# Patient Record
Sex: FEMALE | Race: WHITE | Employment: UNEMPLOYED | URBAN - METROPOLITAN AREA
[De-identification: names, ages, dates, MRNs, and addresses within clinical notes are randomized per-mention and may not be internally consistent; named-entity substitution may affect disease eponyms.]

---

## 2017-11-21 ENCOUNTER — TRANSFERRED RECORDS (OUTPATIENT)
Dept: HEALTH INFORMATION MANAGEMENT | Facility: CLINIC | Age: 42
End: 2017-11-21

## 2018-06-11 ENCOUNTER — TRANSFERRED RECORDS (OUTPATIENT)
Dept: HEALTH INFORMATION MANAGEMENT | Facility: CLINIC | Age: 43
End: 2018-06-11

## 2018-06-14 ENCOUNTER — TRANSFERRED RECORDS (OUTPATIENT)
Dept: HEALTH INFORMATION MANAGEMENT | Facility: CLINIC | Age: 43
End: 2018-06-14

## 2018-06-21 ENCOUNTER — MEDICAL CORRESPONDENCE (OUTPATIENT)
Dept: HEALTH INFORMATION MANAGEMENT | Facility: CLINIC | Age: 43
End: 2018-06-21

## 2018-06-26 DIAGNOSIS — R79.89 ABNORMAL LFTS (LIVER FUNCTION TESTS): Primary | ICD-10-CM

## 2018-07-09 ENCOUNTER — TRANSFERRED RECORDS (OUTPATIENT)
Dept: HEALTH INFORMATION MANAGEMENT | Facility: CLINIC | Age: 43
End: 2018-07-09

## 2018-07-10 ENCOUNTER — TRANSFERRED RECORDS (OUTPATIENT)
Dept: HEALTH INFORMATION MANAGEMENT | Facility: CLINIC | Age: 43
End: 2018-07-10

## 2018-07-18 ENCOUNTER — OFFICE VISIT (OUTPATIENT)
Dept: GASTROENTEROLOGY | Facility: CLINIC | Age: 43
End: 2018-07-18
Attending: PHYSICIAN ASSISTANT
Payer: COMMERCIAL

## 2018-07-18 ENCOUNTER — HEALTH MAINTENANCE LETTER (OUTPATIENT)
Age: 43
End: 2018-07-18

## 2018-07-18 VITALS
SYSTOLIC BLOOD PRESSURE: 137 MMHG | HEIGHT: 70 IN | OXYGEN SATURATION: 97 % | HEART RATE: 63 BPM | TEMPERATURE: 97.6 F | DIASTOLIC BLOOD PRESSURE: 89 MMHG | WEIGHT: 293 LBS | BODY MASS INDEX: 41.95 KG/M2

## 2018-07-18 DIAGNOSIS — R79.89 ABNORMAL LFTS (LIVER FUNCTION TESTS): ICD-10-CM

## 2018-07-18 DIAGNOSIS — R79.89 ELEVATED LFTS: ICD-10-CM

## 2018-07-18 DIAGNOSIS — R79.89 ELEVATED LFTS: Primary | ICD-10-CM

## 2018-07-18 DIAGNOSIS — Z86.19 HISTORY OF CMV: ICD-10-CM

## 2018-07-18 LAB
ALBUMIN SERPL-MCNC: 3.8 G/DL (ref 3.4–5)
ALP SERPL-CCNC: 88 U/L (ref 40–150)
ALT SERPL W P-5'-P-CCNC: 138 U/L (ref 0–50)
ANION GAP SERPL CALCULATED.3IONS-SCNC: 8 MMOL/L (ref 3–14)
AST SERPL W P-5'-P-CCNC: 86 U/L (ref 0–45)
BILIRUB DIRECT SERPL-MCNC: 0.1 MG/DL (ref 0–0.2)
BILIRUB SERPL-MCNC: 0.4 MG/DL (ref 0.2–1.3)
BUN SERPL-MCNC: 20 MG/DL (ref 7–30)
CALCIUM SERPL-MCNC: 8.5 MG/DL (ref 8.5–10.1)
CHLORIDE SERPL-SCNC: 112 MMOL/L (ref 94–109)
CO2 SERPL-SCNC: 22 MMOL/L (ref 20–32)
CREAT SERPL-MCNC: 0.8 MG/DL (ref 0.52–1.04)
ERYTHROCYTE [DISTWIDTH] IN BLOOD BY AUTOMATED COUNT: 14.6 % (ref 10–15)
FERRITIN SERPL-MCNC: 325 NG/ML (ref 12–150)
GFR SERPL CREATININE-BSD FRML MDRD: 78 ML/MIN/1.7M2
GLUCOSE SERPL-MCNC: 125 MG/DL (ref 70–99)
HBA1C MFR BLD: 4.4 % (ref 0–5.6)
HCT VFR BLD AUTO: 39.3 % (ref 35–47)
HGB BLD-MCNC: 12.9 G/DL (ref 11.7–15.7)
INR PPP: 1.02 (ref 0.86–1.14)
IRON SATN MFR SERPL: 21 % (ref 15–46)
IRON SERPL-MCNC: 70 UG/DL (ref 35–180)
MCH RBC QN AUTO: 29.8 PG (ref 26.5–33)
MCHC RBC AUTO-ENTMCNC: 32.8 G/DL (ref 31.5–36.5)
MCV RBC AUTO: 91 FL (ref 78–100)
PLATELET # BLD AUTO: 217 10E9/L (ref 150–450)
POTASSIUM SERPL-SCNC: 4.4 MMOL/L (ref 3.4–5.3)
PROT SERPL-MCNC: 7.4 G/DL (ref 6.8–8.8)
RBC # BLD AUTO: 4.33 10E12/L (ref 3.8–5.2)
SODIUM SERPL-SCNC: 142 MMOL/L (ref 133–144)
TIBC SERPL-MCNC: 343 UG/DL (ref 240–430)
WBC # BLD AUTO: 6.4 10E9/L (ref 4–11)

## 2018-07-18 PROCEDURE — 83540 ASSAY OF IRON: CPT | Performed by: PHYSICIAN ASSISTANT

## 2018-07-18 PROCEDURE — 86039 ANTINUCLEAR ANTIBODIES (ANA): CPT | Performed by: PHYSICIAN ASSISTANT

## 2018-07-18 PROCEDURE — 80076 HEPATIC FUNCTION PANEL: CPT | Performed by: PHYSICIAN ASSISTANT

## 2018-07-18 PROCEDURE — 83550 IRON BINDING TEST: CPT | Performed by: PHYSICIAN ASSISTANT

## 2018-07-18 PROCEDURE — 83516 IMMUNOASSAY NONANTIBODY: CPT | Mod: 91 | Performed by: PHYSICIAN ASSISTANT

## 2018-07-18 PROCEDURE — 85610 PROTHROMBIN TIME: CPT | Performed by: PHYSICIAN ASSISTANT

## 2018-07-18 PROCEDURE — 83516 IMMUNOASSAY NONANTIBODY: CPT | Performed by: PHYSICIAN ASSISTANT

## 2018-07-18 PROCEDURE — 85027 COMPLETE CBC AUTOMATED: CPT | Performed by: PHYSICIAN ASSISTANT

## 2018-07-18 PROCEDURE — 83036 HEMOGLOBIN GLYCOSYLATED A1C: CPT | Performed by: PHYSICIAN ASSISTANT

## 2018-07-18 PROCEDURE — 82728 ASSAY OF FERRITIN: CPT | Performed by: PHYSICIAN ASSISTANT

## 2018-07-18 PROCEDURE — 36415 COLL VENOUS BLD VENIPUNCTURE: CPT | Performed by: PHYSICIAN ASSISTANT

## 2018-07-18 PROCEDURE — 80048 BASIC METABOLIC PNL TOTAL CA: CPT | Performed by: PHYSICIAN ASSISTANT

## 2018-07-18 PROCEDURE — 86038 ANTINUCLEAR ANTIBODIES: CPT | Performed by: PHYSICIAN ASSISTANT

## 2018-07-18 PROCEDURE — G0463 HOSPITAL OUTPT CLINIC VISIT: HCPCS | Mod: ZF

## 2018-07-18 RX ORDER — THYROID 60 MG/1
60 TABLET ORAL DAILY
COMMUNITY
Start: 2018-06-12

## 2018-07-18 ASSESSMENT — PAIN SCALES - GENERAL: PAINLEVEL: NO PAIN (0)

## 2018-07-18 NOTE — NURSING NOTE
Chief Complaint   Patient presents with     Consult     consult Elevated CMV      Mansoor Godoy MA

## 2018-07-18 NOTE — PROGRESS NOTES
Hepatology Clinic note  Briana Linda   Date of Birth 1975  Date of Service 7/18/2018     REASON FOR CONSULTATION: Elevated LFT's  REFERRING PROVIDER: Corey Linda MD         Assessment/plan:   Briana Linda is a 43 year old female with history of elevated liver function tests dating back to April 2015. Recent abdominal ultrasound showing steatohepatitis. Liver function is otherwise normal.   No current evidence of biochemical, radiological or physical markers of cirrhosis. Risk factors for fatty liver disease include morbid obesity, weight gain, history of high cholesterol and abnormal fasting glucose. Her Hep B and C serologies are negative. She has history of a positive STEPHANIA and history of hypothyroidism, so it will rule out autoimmune liver disease. We discussed the pathophysiology and natural history of nonalcoholic fatty liver disease. We discussed NAFLD which includes optimization of weight loss, as well as optimization of risk factors including cholesterol and blood glucose.     - Discussed importance of proper nutrition and exercise - emphasizing limit carbohydrates, especially simple carbohydrates. Mediterranean Diet   - We discussed how a weight loss of 3-5% can show improvement on steatosis and weight loss of 7-10% can show improvement on fibrosis on histology.  - Check STEPHANIA, f-actin, iron studies, hemoglobin A1c  - Will check CMV DNA   - Recommended following up with her PCP in regards to positive CMV IGG and IGM and symptoms of temperature dysregulation, fatigue, joint pains   - Follow up in  6 months     Tammy Sloan PA-C   [facility]     -----------------------------------------------------       HPI:   Briana Linda is a 43 year old female presenting for the evaluation of elevated LFT's.     Patient states that she got mononucleosis at the age of 18.  She states that she feels like the virus never cleared.  Throughout the past 20+ years she has about with an array of  problems including is getting sick very easily.  She recently had a lab workup with her primary care physician noted elevated CMV levels.  She also had elevated liver transaminases.  On chart review noted elevated transaminases back to April 2015.  She had an abdominal ultrasound that noted steatohepatitis in November 2017. Transaminases including ALT have ranged from 114 to 55 and AST to 68 to 38.     She was started on [place] in the middle of June for hypothyroidismism.  She has been on levothyroxine in the past and did not tolerate it.  There is number of years where she did not take any medication.  After labor day, she had problems with temperature regulation.  She would have extreme sweating and then shift to shivering really bad. Then in mid June she states that she had intermittent fevers for a number of days.  She also notes symptoms of shortness of breath, lightheadedness and foggy thinking and overall feeling lethargic.     At this time, she is still unable to tolerate any heat. Her hands get shaky. She states her taste buds have changed and she doesn't have much of an appetite. Weight loss has been difficult for her. Attempts of increasing physical activity have lead to weight gain in the past. She has gained nearly 20 lbs in the last year. She also notes a period of time where was hospitalized she had severe headaches. She states her symptoms improved with acyclovir.     Patient denies jaundice, lower extremity edema, abdominal distension or confusion.  Patient also denies melena, hematochezia or hematemesis. Patient denies weight loss, fevers, sweats or chills.    PMH: morbid obesity, hypothyroidism    SMH: hysterectomy, oophorectomy, cholecystectomy, uterine prolapse repair    Medications:  Massena thyroid, omeprazole PRN, acid reflux     No previous tobacco use. She drinks one to two times a month. No previous IV/IN drug use.    Currently does not work. Patient currently lives her  and  children.     No family history of liver disease or liver cancer.     CMV IgG 1.20 by  CMV IgM greater than 240 high  STEPHANIA positive, 1: 85  Have a IgM nonreactive  Hep B surface antigen nonreactive hep B core antibody nonreactive  Hep C antibody negative  Vitamin D - 11 - low     Medical hx Surgical hx   Past Medical History:   Diagnosis Date     Hypothyroidism     Past Surgical History:   Procedure Laterality Date     HYSTERECTOMY, VAGINAL  1/24/2009                 Medications:     Current Outpatient Prescriptions   Medication     thyroid (ARMOUR THYROID) 60 MG tablet     No current facility-administered medications for this visit.             Allergies:     Allergies   Allergen Reactions     Amoxicillin      Zithromax [Azithromycin Dihydrate]             Social History:     Social History     Social History     Marital status:      Spouse name: N/A     Number of children: N/A     Years of education: N/A     Occupational History     Not on file.     Social History Main Topics     Smoking status: Never Smoker     Smokeless tobacco: Never Used     Alcohol use Yes      Comment: rare     Drug use: No     Sexual activity: Not on file     Other Topics Concern     Not on file     Social History Narrative            Family History:     Family History   Problem Relation Age of Onset     Cancer Mother      ovarian     Cancer Maternal Grandmother      lung     Myocardial Infarction Maternal Grandfather      Myocardial Infarction Paternal Grandfather               Review of Systems:   Gen: See HPI     HEENT: No change in vision or hearing, mouth sores, dysphagia, lymph nodes  Resp: No shortness of breath, coughing, hx of asthma  CV: No chest pain, palpitations, syncope   GI: See HPI  : No dysuria, history of stones, urine color    Skin: No rash; no pruritus or psoriasis  MS: No arthralgias, myalgias, joint swelling  Neuro: No memory changes, confusion, numbness    Heme: No difficulty clotting, bruising,  "bleeding  Psych:  No anxiety, depression, agitation          Physical Exam:   VS:  /89  Pulse 63  Temp 97.6  F (36.4  C) (Oral)  Ht 1.784 m (5' 10.25\")  Wt 134.4 kg (296 lb 6.4 oz)  SpO2 97%  BMI 42.23 kg/m2      Gen: A&Ox3, NAD, morbidly obese  HEENT: non-icteric,  no cervical lymphadenopathy, no lesions or ulcers in oropharynx  CV: RRR, no overt murmurs  Lung: CTA Bilatererally, no wheezing or crackles.   Lym- no palpable lymphadenopathy  Abd: soft, NT, ND, unable to palpate organomegaly due to body habitus  Ext: no edema, intact pulses.   Skin: No rash, no palmar erythema, telangiectasias or jaundice  Neuro: grossly intact, no asterixis   Psych: appropriate mood and affects         Data:     Abdominal ultrasound limited 11/21/2017:  Abdominal ultrasound noting diffusely increased and coarsened hepatic parenchymal echo texture consistent with hepatic steatosis  "

## 2018-07-18 NOTE — LETTER
7/18/2018      RE: Briana Linda  [address] [facility] [place] [address]       Hepatology Clinic note  Briana Linda   Date of Birth 1975  Date of Service 7/18/2018     REASON FOR CONSULTATION: Elevated LFT's  REFERRING PROVIDER: Corey Linda MD         Assessment/plan:   Briana Linda is a 43 year old female with history of elevated liver function tests dating back to April 2015. Recent abdominal ultrasound showing steatohepatitis. Liver function is otherwise normal.   No current evidence of biochemical, radiological or physical markers of cirrhosis. Risk factors for fatty liver disease include morbid obesity, weight gain, history of high cholesterol and abnormal fasting glucose. Her Hep B and C serologies are negative. She has history of a positive STEPHANIA and history of hypothyroidism, so it will rule out autoimmune liver disease. We discussed the pathophysiology and natural history of nonalcoholic fatty liver disease. We discussed NAFLD which includes optimization of weight loss, as well as optimization of risk factors including cholesterol and blood glucose.     - Discussed importance of proper nutrition and exercise - emphasizing limit carbohydrates, especially simple carbohydrates. Mediterranean Diet   - We discussed how a weight loss of 3-5% can show improvement on steatosis and weight loss of 7-10% can show improvement on fibrosis on histology.  - Check STEPHANIA, f-actin, iron studies, hemoglobin A1c  - Will check CMV DNA   - Recommended following up with her PCP in regards to positive CMV IGG and IGM and symptoms of temperature dysregulation, fatigue, joint pains   - Follow up in  6 months     Tammy Sloan PA-C   [facility] Hepat[facility]     -----------------------------------------------------       HPI:   Briana Linda is a 43 year old female presenting for the evaluation of elevated LFT's.     Patient states that she got mononucleosis at the age of 18.  She states that she feels like the virus  never cleared.  Throughout the past 20+ years she has about with an array of problems including is getting sick very easily.  She recently had a lab workup with her primary care physician noted elevated CMV levels.  She also had elevated liver transaminases.  On chart review noted elevated transaminases back to April 2015.  She had an abdominal ultrasound that noted steatohepatitis in November 2017. Transaminases including ALT have ranged from 114 to 55 and AST to 68 to 38.     She was started on Gateway in the middle of June for hypothyroidismism.  She has been on levothyroxine in the past and did not tolerate it.  There is number of years where she did not take any medication.  After labor day, she had problems with temperature regulation.  She would have extreme sweating and then shift to shivering really bad. Then in mid June she states that she had intermittent fevers for a number of days.  She also notes symptoms of shortness of breath, lightheadedness and foggy thinking and overall feeling lethargic.     At this time, she is still unable to tolerate any heat. Her hands get shaky. She states her taste buds have changed and she doesn't have much of an appetite. Weight loss has been difficult for her. Attempts of increasing physical activity have lead to weight gain in the past. She has gained nearly 20 lbs in the last year. She also notes a period of time where was hospitalized she had severe headaches. She states her symptoms improved with acyclovir.     Patient denies jaundice, lower extremity edema, abdominal distension or confusion.  Patient also denies melena, hematochezia or hematemesis. Patient denies weight loss, fevers, sweats or chills.    PMH: morbid obesity, hypothyroidism    SMH: hysterectomy, oophorectomy, cholecystectomy, uterine prolapse repair    Medications:  Gateway thyroid, omeprazole PRN, acid reflux     No previous tobacco use. She drinks one to two times a month. No previous IV/IN drug use.     Currently does not work. Patient currently lives her  and children.     No family history of liver disease or liver cancer.     CMV IgG 1.20 by  CMV IgM greater than 240 high  STEPHANIA positive, 1: 85  Have a IgM nonreactive  Hep B surface antigen nonreactive hep B core antibody nonreactive  Hep C antibody negative  Vitamin D - 11 - low     Medical hx Surgical hx   Past Medical History:   Diagnosis Date     Hypothyroidism     Past Surgical History:   Procedure Laterality Date     HYSTERECTOMY, VAGINAL  1/24/2009                 Medications:     Current Outpatient Prescriptions   Medication     thyroid (ARMOUR THYROID) 60 MG tablet     No current facility-administered medications for this visit.             Allergies:     Allergies   Allergen Reactions     Amoxicillin      Zithromax [Azithromycin Dihydrate]             Social History:     Social History     Social History     Marital status:      Spouse name: N/A     Number of children: N/A     Years of education: N/A     Occupational History     Not on file.     Social History Main Topics     Smoking status: Never Smoker     Smokeless tobacco: Never Used     Alcohol use Yes      Comment: rare     Drug use: No     Sexual activity: Not on file     Other Topics Concern     Not on file     Social History Narrative            Family History:     Family History   Problem Relation Age of Onset     Cancer Mother      ovarian     Cancer Maternal Grandmother      lung     Myocardial Infarction Maternal Grandfather      Myocardial Infarction Paternal Grandfather               Review of Systems:   Gen: See HPI     HEENT: No change in vision or hearing, mouth sores, dysphagia, lymph nodes  Resp: No shortness of breath, coughing, hx of asthma  CV: No chest pain, palpitations, syncope   GI: See HPI  : No dysuria, history of stones, urine color    Skin: No rash; no pruritus or psoriasis  MS: No arthralgias, myalgias, joint swelling  Neuro: No memory changes,  "confusion, numbness    Heme: No difficulty clotting, bruising, bleeding  Psych:  No anxiety, depression, agitation          Physical Exam:   VS:  /89  Pulse 63  Temp 97.6  F (36.4  C) (Oral)  Ht 1.784 m (5' 10.25\")  Wt 134.4 kg (296 lb 6.4 oz)  SpO2 97%  BMI 42.23 kg/m2      Gen: A&Ox3, NAD, morbidly obese  HEENT: non-icteric,  no cervical lymphadenopathy, no lesions or ulcers in oropharynx  CV: RRR, no overt murmurs  Lung: CTA Bilatererally, no wheezing or crackles.   Lym- no palpable lymphadenopathy  Abd: soft, NT, ND, unable to palpate organomegaly due to body habitus  Ext: no edema, intact pulses.   Skin: No rash, no palmar erythema, telangiectasias or jaundice  Neuro: grossly intact, no asterixis   Psych: appropriate mood and affects         Data:     Abdominal ultrasound limited 11/21/2017:  Abdominal ultrasound noting diffusely increased and coarsened hepatic parenchymal echo texture consistent with hepatic steatosis    Tammy Sloan PA-C      "

## 2018-07-18 NOTE — MR AVS SNAPSHOT
After Visit Summary   7/18/2018    Briana Linda    MRN: [id number]           Patient Information     Date Of Birth          1975        Visit Information        Provider Department      7/18/2018 1:45 PM Tammy Sloan PA-C [facility] Hepat[facility]        Today's Diagnoses     Elevated LFTs    -  1       Follow-ups after your visit        Follow-up notes from your care team     Return in about 6 months (around 1/18/2019).      Your next 10 appointments already scheduled     Jul 18, 2018  2:45 PM CDT   Lab with  LAB   [facility] ([facility])    [address]  [address] [facility] [address] [phone]            Jan 14, 2019 11:00 AM CST   Lab with  LAB   [facility] ([facility])    [address] [address] [phone]            Jan 14, 2019 12:00 PM CST   (Arrive by 11:45 AM)   Return General Liver with Tammy Sloan PA-C   [facility] ([facility])    [address] [place] [address]  [address]  [facility] [address] [phone]              Future tests that were ordered for you today     Open Future Orders        Priority Expected Expires Ordered    Hemoglobin A1c Routine  8/17/2018 7/18/2018    Tissue transglutaminase kayla IgA and IgG Routine  8/17/2018 7/18/2018    IRON Routine  8/17/2018 7/18/2018    IRON AND IRON BINDING CAPACITY Routine  8/17/2018 7/18/2018    FERRITIN Routine  8/17/2018 7/18/2018            Who to contact     If you have questions or need follow up information about today's clinic visit or your schedule please contact [facility] HEPATOLOG[facility] directly at [phone].  Normal or non-critical lab and imaging results will be communicated to you by MyChart, letter or phone within 4 business days after the clinic has received the results. If you do not hear from us within 7 days, please contact the clinic through MyChart or phone.  "If you have a critical or abnormal lab result, we will notify you by phone as soon as possible.  Submit refill requests through [facility] or call your pharmacy and they will forward the refill request to us. Please allow 3 business days for your refill to be completed.          Additional Information About Your Visit        Context[employer]ker[employer] Information     [facility] gives you secure access to your electronic health record. If you see a primary care provider, you can also send messages to your care team and make appointments. If you have questions, please call your primary care clinic.  If you do not have a primary care provider, please call [phone] and they will assist you.        Care EveryWhere ID     This is your Care EveryWhere ID. This could be used by other organizations to access your [place] medical records  [id number]        Your Vitals Were     Pulse Temperature Height Pulse Oximetry BMI (Body Mass Index)       63 97.6  F (36.4  C) (Oral) 1.784 m (5' 10.25\") 97% 42.23 kg/m2        Blood Pressure from Last 3 Encounters:   07/18/18 137/89   04/11/13 132/84    Weight from Last 3 Encounters:   07/18/18 134.4 kg (296 lb 6.4 oz)   04/11/13 127 kg (280 lb)              We Performed the Following     Anti Nuclear Griselda IgG by IFA with Reflex        Primary Care Provider Office Phone # Fax #    Corey Linda [phone]       [place] [address]        Equal Access to Services     VENITA PALAFOX AH: Hadii mame ku hadasho Soomaali, waaxda luqadaha, qaybta kaalmada adeegyada, marina bae . So [facility] [phone].    ATENCIÓN: Si habla español, tiene a elizabeth disposición servicios gratuitos de asistencia lingüística. Aliya al [phone].    We comply with applicable federal civil rights laws and [place] laws. We do not discriminate on the basis of race, color, national origin, age, disability, sex, sexual orientation, or gender identity.       "      Thank you!     Thank you for choosing [facility]PAT[facility]  for your care. Our goal is always to provide you with excellent care. Hearing back from our patients is one way we can continue to improve our services. Please take a few minutes to complete the written survey that you may receive in the mail after your visit with us. Thank you!             Your Updated Medication List - Protect others around you: Learn how to safely use, store and throw away your medicines at www.disposemymeds.org.          This list is accurate as of 7/18/18  2:43 PM.  Always use your most recent med list.                   Brand Name Dispense Instructions for use Diagnosis    ARMOUR THYROID 60 MG tablet   Generic drug:  thyroid       Elevated LFTs

## 2018-07-19 LAB
ANA PAT SER IF-IMP: ABNORMAL
ANA SER QL IF: POSITIVE
ANA TITR SER IF: ABNORMAL {TITER}
CMV DNA SPEC NAA+PROBE-ACNC: NORMAL [IU]/ML
CMV DNA SPEC NAA+PROBE-LOG#: NORMAL {LOG_IU}/ML
SPECIMEN SOURCE: NORMAL
TTG IGA SER-ACNC: <1 U/ML
TTG IGG SER-ACNC: <1 U/ML

## 2018-07-20 ENCOUNTER — MEDICAL CORRESPONDENCE (OUTPATIENT)
Dept: HEALTH INFORMATION MANAGEMENT | Facility: CLINIC | Age: 43
End: 2018-07-20

## 2018-07-22 LAB — SMA IGG SER-ACNC: 8 UNITS (ref 0–19)

## 2018-07-26 ENCOUNTER — TELEPHONE (OUTPATIENT)
Dept: RHEUMATOLOGY | Facility: CLINIC | Age: 43
End: 2018-07-26

## 2018-07-26 NOTE — TELEPHONE ENCOUNTER
Health Call Center    Phone Message    May a detailed message be left on voicemail: Unknown     Reason for Call: Other: Dr. Corey Linda with [facility] is referring this patient to Rheumatology for cytomegalovirus infection, unspecified cytomegaloviral infection type. The records have been forwarded to the clinic for review.      Action Taken: Message routed to:  [facility] ([facility]  Rheumatology

## 2018-08-13 NOTE — TELEPHONE ENCOUNTER
Spoke with patient who stated that this appointment is no longer needed.  Becca Caldera CMA  8/13/2018 3:26 PM

## 2018-08-14 ENCOUNTER — TELEPHONE (OUTPATIENT)
Dept: INFECTIOUS DISEASES | Facility: CLINIC | Age: 43
End: 2018-08-14

## 2018-08-14 ENCOUNTER — TRANSFERRED RECORDS (OUTPATIENT)
Dept: HEALTH INFORMATION MANAGEMENT | Facility: CLINIC | Age: 43
End: 2018-08-14

## 2018-08-14 NOTE — TELEPHONE ENCOUNTER
Patient contacted and reminded of upcoming appointment.  Patient confirmed they will be attending.  Patient instructed to bring updated medications list to appointment.  Mansoor Godoy MA

## 2018-08-15 ENCOUNTER — OFFICE VISIT (OUTPATIENT)
Dept: INFECTIOUS DISEASES | Facility: CLINIC | Age: 43
End: 2018-08-15
Attending: INTERNAL MEDICINE
Payer: COMMERCIAL

## 2018-08-15 VITALS
TEMPERATURE: 97.8 F | DIASTOLIC BLOOD PRESSURE: 81 MMHG | BODY MASS INDEX: 41.95 KG/M2 | WEIGHT: 293 LBS | HEIGHT: 70 IN | SYSTOLIC BLOOD PRESSURE: 153 MMHG | RESPIRATION RATE: 18 BRPM | HEART RATE: 62 BPM | OXYGEN SATURATION: 98 %

## 2018-08-15 DIAGNOSIS — B25.9 CMV INFECTION, ACUTE (H): ICD-10-CM

## 2018-08-15 DIAGNOSIS — B27.10 CYTOMEGALOVIRAL MONONUCLEOSIS WITHOUT COMPLICATION: Primary | ICD-10-CM

## 2018-08-15 PROCEDURE — G0463 HOSPITAL OUTPT CLINIC VISIT: HCPCS | Mod: ZF

## 2018-08-15 ASSESSMENT — ENCOUNTER SYMPTOMS
NECK MASS: 0
POOR WOUND HEALING: 0
CHILLS: 0
FATIGUE: 1
DIARRHEA: 1
BLOOD IN STOOL: 0
NAUSEA: 1
PARALYSIS: 0
JOINT SWELLING: 0
BLOATING: 0
SHORTNESS OF BREATH: 1
BACK PAIN: 1
DYSPNEA ON EXERTION: 1
TREMORS: 0
NAIL CHANGES: 0
ALTERED TEMPERATURE REGULATION: 1
SPEECH CHANGE: 0
SMELL DISTURBANCE: 1
DECREASED APPETITE: 0
STIFFNESS: 1
DEPRESSION: 0
WEIGHT LOSS: 0
FEVER: 0
SPUTUM PRODUCTION: 0
DISTURBANCES IN COORDINATION: 0
SINUS PAIN: 0
TROUBLE SWALLOWING: 0
INSOMNIA: 0
VOMITING: 0
POSTURAL DYSPNEA: 0
POLYDIPSIA: 0
PANIC: 1
ARTHRALGIAS: 1
WHEEZING: 0
HEARTBURN: 1
MEMORY LOSS: 0
ABDOMINAL PAIN: 0
DECREASED CONCENTRATION: 0
MUSCLE CRAMPS: 0
MYALGIAS: 1
DIZZINESS: 1
SINUS CONGESTION: 0
SORE THROAT: 0
BOWEL INCONTINENCE: 0
COUGH: 0
HEADACHES: 1
POLYPHAGIA: 0
MUSCLE WEAKNESS: 0
NUMBNESS: 0
HEMOPTYSIS: 0
LOSS OF CONSCIOUSNESS: 0
RECTAL PAIN: 0
CONSTIPATION: 1
HALLUCINATIONS: 0
SEIZURES: 0
HOARSE VOICE: 0
WEAKNESS: 0
TINGLING: 0
NECK PAIN: 0
INCREASED ENERGY: 0
NIGHT SWEATS: 0
TASTE DISTURBANCE: 1
SKIN CHANGES: 0
SNORES LOUDLY: 0
NERVOUS/ANXIOUS: 0
COUGH DISTURBING SLEEP: 0
JAUNDICE: 0
WEIGHT GAIN: 0

## 2018-08-15 ASSESSMENT — PAIN SCALES - GENERAL: PAINLEVEL: NO PAIN (0)

## 2018-08-15 NOTE — PROGRESS NOTES
[facility]  Infectious Disease Clinic Note     Patient:  Briana Linda, Date of birth 1975, Medical record number [id number]  Date of Visit:  08/15/2018         Assessment and Plan:   Plan:  1) Continue to monitor clinical course.   2) No further workup needed at this time.  3) Follow up as needed.     Assessment:  Ms. Linda is a 42 y/o female with a history hypothyroidism who presents for evaluation regarding possible CMV infection. In late May - early June she started to experience fatigue, fevers, chills, rigors, night sweats, muscle and joint pain. On June 14, 2018 she had CMV serologies that were positive (IgG 1.20, IgM >240) and CT scan that demonstrated mild splenomegaly. She continued to have fevers till July at which time the fevers abated and her symptoms started to lessen. On 7/18/2018, a CMV PCR was not detected. She may have had an acute CMV mononucleosis with the protracted fevers and fatigue and without any tonsillar exudates and lymphadenopathy. Because primary CMV infection in immunocompetent persons is usually self-limiting and there is recovery within days to weeks, and she is currently improving, we would not recommend any intervention at this time.      Patient discussed with Dr. Marce Padilla, Infectious Diseases Fellow  [phone]        History of the Infectious Disease Illness:     Ms. Linda is a 42 y/o female with a history of hypothyroidism who presents for evaluation regarding CMV infection. She has had about a 2 month history of illness that is now just improving.     The time course is as follows:   May 28th - felt sick, fatigued, cold, chills  June 6th - Muscle pain, joint pain, sore throat   June 7th - Sinus pain, headache, ear fullness - at this time she received antibiotics   June 7th - She was noted to have a TSH 16.3, T4 0.8  June 11 -  She was started on Piseco 60 mg for thyroid hormone replacement   Su 10 - June 17 - She was  experiencing fevers , chills and rigors. She also had drenching night sweats   Her Fevers stopped on the June 17, but her muscle joint pain continued   The night sweats continued into July   Mid July - She starting feeling better - slowly regaining her energy and appetite     Today, she states that she continues to slowly improve. She is no longer experiencing fevers, chills, rigors, night sweats. She denies any chest pain, shortness of breath, cough. She denies any abdominal pain, nausea, vomiting, diarrhea, or constipation.     Exposure History:   [place], born and raised   She has visited: [place], [place], [place], [place], [place], [place], [place], [place], [place], [place], and [place]       No international travel   No hiking   Does garden actively   No fresh water exposure   Dog - house bound   Bird - Green cheek conure (5-6 years)   Fish   No wild animal exposure     Past Medical History:   Diagnosis Date     GERD (gastroesophageal reflux disease)      Hypothyroidism      Morbid obesity (H)      Past Surgical History:   Procedure Laterality Date     HYSTERECTOMY, VAGINAL  1/24/2009     Family History   Problem Relation Age of Onset     Cancer Mother      ovarian     Cancer Maternal Grandmother      lung     Myocardial Infarction Maternal Grandfather      Myocardial Infarction Paternal Grandfather      Social History     Social History     Marital status:      Spouse name: N/A     Number of children: N/A     Years of education: N/A     Occupational History     Not on file.     Social History Main Topics     Smoking status: Never Smoker     Smokeless tobacco: Never Used     Alcohol use Yes      Comment: rare     Drug use: No     Sexual activity: Not on file     Other Topics Concern     Not on file     Social History Narrative       There is no immunization history on file for this patient.  Patient Active Problem List   Diagnosis     CARDIOVASCULAR SCREENING; LDL GOAL LESS THAN  "160          Review of Systems:   CONSTITUTIONAL:  No fevers or chills, + loss of energy, easy fatigue   EYES: negative for icterus  ENT:  negative for hearing loss, tinnitus and sore throat  RESPIRATORY:  negative for cough with sputum and dyspnea  CARDIOVASCULAR:  negative for chest pain, dyspnea  GASTROINTESTINAL:  negative for nausea, vomiting, diarrhea and constipation  GENITOURINARY:  negative for dysuria  HEME:  No easy bruising  INTEGUMENT:  negative for rash and pruritus  NEURO:  Negative for headache         Current Medications (antimicrobials listed in bold):     Current Outpatient Prescriptions   Medication Sig Dispense Refill     thyroid (ARMOUR THYROID) 60 MG tablet Take 60 mg by mouth daily             Allergies:     Allergies   Allergen Reactions     Amoxicillin      Zithromax [Azithromycin Dihydrate]           Physical Exam:   Vitals were reviewed.   /81 (BP Location: Right arm, Patient Position: Chair, Cuff Size: Adult Regular)  Pulse 62  Temp 97.8  F (36.6  C) (Oral)  Resp 18  Ht 1.784 m (5' 10.25\")  Wt 134.3 kg (296 lb)  SpO2 98%  BMI 42.17 kg/m2  Exam:  GENERAL:  Overweight, well-developed, well-nourished, alert, oriented, in no acute distress.  HEENT:  Head is normocephalic, atraumatic     EYES:  Eyes have anicteric sclerae.    ENT:  Oropharynx is moist without exudates or ulcers.  NECK:  Supple.  LUNGS:  Clear to auscultation.  CARDIOVASCULAR:  Regular rate and rhythm with no murmurs, gallops or rubs.  ABDOMEN:  Normal bowel sounds, soft, nontender.  SKIN:  No acute rashes.   NEUROLOGIC:  Grossly nonfocal.         Laboratory Data:     Metabolic Studies    Recent Labs   Lab Test  07/18/18   1519   NA  142   POTASSIUM  4.4   CHLORIDE  112*   CO2  22   ANIONGAP  8   BUN  20   CR  0.80   GFRESTIMATED  78   GLC  125*   CHON  8.5     Hepatic Studies    Recent Labs   Lab Test  07/18/18   1519   BILITOTAL  0.4   ALKPHOS  88   ALBUMIN  3.8   AST  86*   ALT  138*     Hematology Studies  "   Recent Labs   Lab Test  07/18/18   1519   WBC  6.4   HGB  12.9   HCT  39.3   MCV  91   PLT  217     Clotting Studies    Recent Labs   Lab Test  07/18/18   1519   INR  1.02     Imaging:    CT: 6/14/2018: Mild splenomegaly

## 2018-08-15 NOTE — MR AVS SNAPSHOT
After Visit Summary   8/15/2018    Briana Linda    MRN: [id number]           Patient Information     Date Of Birth          1975        Visit Information        Provider Department      8/15/2018 2:30 PM Nasrin Padilla DO [facility] and Infectious Diseases        Today's Diagnoses     Cytomegaloviral mononucleosis without complication    -  1    CMV infection, acute (H)           Follow-ups after your visit        Your next 10 appointments already scheduled     Jan 14, 2019 11:00 AM CST   Lab with  LAB   [facility] ([facility])    [address]  1st Floor  [place] [address] [phone]            Jan 14, 2019 12:00 PM CST   (Arrive by 11:45 AM)   Return General Liver with Tammy Sloan PA-C   [facility] Hepatology ([facility])    [address]  [address]  [place] [address] [phone]              Who to contact     If you have questions or need follow up information about today's clinic visit or your schedule please contact [facility] AND INFECTIOUS DISEASES directly at [phone].  Normal or non-critical lab and imaging results will be communicated to you by Flexianthart, letter or phone within 4 business days after the clinic has received the results. If you do not hear from us within 7 days, please contact the clinic through Flexianthart or phone. If you have a critical or abnormal lab result, we will notify you by phone as soon as possible.  Submit refill requests through Pipette or call your pharmacy and they will forward the refill request to us. Please allow 3 business days for your refill to be completed.          Additional Information About Your Visit        MyChart Information     Pipette gives you secure access to your electronic health record. If you see a primary care provider, you can also send messages to your care team and make appointments. If you have  "questions, please call your primary care clinic.  If you do not have a primary care provider, please call [phone] and they will assist you.        Care EveryWhere ID     This is your Care EveryWhere ID. This could be used by other organizations to access your [facility] medical records  [id number]        Your Vitals Were     Pulse Temperature Respirations Height Pulse Oximetry BMI (Body Mass Index)    62 97.8  F (36.6  C) (Oral) 18 1.784 m (5' 10.25\") 98% 42.17 kg/m2       Blood Pressure from Last 3 Encounters:   08/15/18 153/81   07/18/18 137/89   04/11/13 132/84    Weight from Last 3 Encounters:   08/15/18 134.3 kg (296 lb)   07/18/18 134.4 kg (296 lb 6.4 oz)   04/11/13 127 kg (280 lb)              Today, you had the following     No orders found for display       Primary Care Provider Office Phone # Fax #    Corey Linda [phone]       [address] [address]        Equal Access to Services     [facility] : Hadii mame ku hadasho Soomaali, waaxda luqadaha, qaybta kaalmada adelaura, marina bae . So [facility] [phone].    ATENCIÓN: Si habla español, tiene a elizabteh disposición servicios gratuitos de asistencia lingüística. [facility] [phone].    We comply with applicable federal civil rights laws and [place] laws. We do not discriminate on the basis of race, color, national origin, age, disability, sex, sexual orientation, or gender identity.            Thank you!     Thank you for choosing [facility] AND INFECTIOUS DISEASES  for your care. Our goal is always to provide you with excellent care. Hearing back from our patients is one way we can continue to improve our services. Please take a few minutes to complete the written survey that you may receive in the mail after your visit with us. Thank you!             Your Updated Medication List - Protect others around you: Learn how to safely use, store and " throw away your medicines at www.disposemymeds.org.          This list is accurate as of 8/15/18 11:59 PM.  Always use your most recent med list.                   Brand Name Dispense Instructions for use Diagnosis    ARMOUR THYROID 60 MG tablet   Generic drug:  thyroid      Take 60 mg by mouth daily    Elevated LFTs

## 2018-09-13 NOTE — PROGRESS NOTES
Infectious Disease Clinic Staff Note: Ms. Linda was seen, examined, and the case was discussed with Dr. Padilla, ID Fellow -- I agree with her consultative history and examination, assessment and plan in this outpatient ID Consult note. This note reflects my observations and opinions, and the plan outlined fully reflects my approach. I have reviewed the available history, radiology, laboratory results, and reports with the Fellow.

## 2018-12-18 DIAGNOSIS — Z86.19 HISTORY OF CMV: ICD-10-CM

## 2018-12-18 DIAGNOSIS — R79.89 ABNORMAL LFTS (LIVER FUNCTION TESTS): Primary | ICD-10-CM

## 2019-01-18 ENCOUNTER — OFFICE VISIT (OUTPATIENT)
Dept: GASTROENTEROLOGY | Facility: CLINIC | Age: 44
End: 2019-01-18
Attending: PHYSICIAN ASSISTANT
Payer: COMMERCIAL

## 2019-01-18 VITALS
SYSTOLIC BLOOD PRESSURE: 143 MMHG | HEART RATE: 71 BPM | HEIGHT: 70 IN | TEMPERATURE: 98.4 F | BODY MASS INDEX: 41.95 KG/M2 | WEIGHT: 293 LBS | DIASTOLIC BLOOD PRESSURE: 88 MMHG

## 2019-01-18 DIAGNOSIS — Z86.19 HISTORY OF CMV: ICD-10-CM

## 2019-01-18 DIAGNOSIS — R79.89 ABNORMAL LFTS (LIVER FUNCTION TESTS): ICD-10-CM

## 2019-01-18 DIAGNOSIS — K76.0 FATTY LIVER DISEASE, NONALCOHOLIC: Primary | ICD-10-CM

## 2019-01-18 LAB
ALBUMIN SERPL-MCNC: 3.6 G/DL (ref 3.4–5)
ALP SERPL-CCNC: 76 U/L (ref 40–150)
ALT SERPL W P-5'-P-CCNC: 79 U/L (ref 0–50)
ANION GAP SERPL CALCULATED.3IONS-SCNC: 4 MMOL/L (ref 3–14)
AST SERPL W P-5'-P-CCNC: 43 U/L (ref 0–45)
BILIRUB DIRECT SERPL-MCNC: 0.1 MG/DL (ref 0–0.2)
BILIRUB SERPL-MCNC: 0.4 MG/DL (ref 0.2–1.3)
BUN SERPL-MCNC: 13 MG/DL (ref 7–30)
CALCIUM SERPL-MCNC: 8.6 MG/DL (ref 8.5–10.1)
CHLORIDE SERPL-SCNC: 106 MMOL/L (ref 94–109)
CO2 SERPL-SCNC: 27 MMOL/L (ref 20–32)
CREAT SERPL-MCNC: 0.76 MG/DL (ref 0.52–1.04)
ERYTHROCYTE [DISTWIDTH] IN BLOOD BY AUTOMATED COUNT: 13.3 % (ref 10–15)
GFR SERPL CREATININE-BSD FRML MDRD: >90 ML/MIN/{1.73_M2}
GLUCOSE SERPL-MCNC: 95 MG/DL (ref 70–99)
HCT VFR BLD AUTO: 42 % (ref 35–47)
HGB BLD-MCNC: 13.1 G/DL (ref 11.7–15.7)
INR PPP: 1.02 (ref 0.86–1.14)
MCH RBC QN AUTO: 28.3 PG (ref 26.5–33)
MCHC RBC AUTO-ENTMCNC: 31.2 G/DL (ref 31.5–36.5)
MCV RBC AUTO: 91 FL (ref 78–100)
PLATELET # BLD AUTO: 216 10E9/L (ref 150–450)
POTASSIUM SERPL-SCNC: 4.1 MMOL/L (ref 3.4–5.3)
PROT SERPL-MCNC: 7.5 G/DL (ref 6.8–8.8)
RBC # BLD AUTO: 4.63 10E12/L (ref 3.8–5.2)
SODIUM SERPL-SCNC: 137 MMOL/L (ref 133–144)
WBC # BLD AUTO: 5.6 10E9/L (ref 4–11)

## 2019-01-18 PROCEDURE — 80048 BASIC METABOLIC PNL TOTAL CA: CPT | Performed by: PHYSICIAN ASSISTANT

## 2019-01-18 PROCEDURE — 85027 COMPLETE CBC AUTOMATED: CPT | Performed by: PHYSICIAN ASSISTANT

## 2019-01-18 PROCEDURE — 80076 HEPATIC FUNCTION PANEL: CPT | Performed by: PHYSICIAN ASSISTANT

## 2019-01-18 PROCEDURE — 36415 COLL VENOUS BLD VENIPUNCTURE: CPT | Performed by: PHYSICIAN ASSISTANT

## 2019-01-18 PROCEDURE — G0463 HOSPITAL OUTPT CLINIC VISIT: HCPCS | Mod: ZF

## 2019-01-18 PROCEDURE — 85610 PROTHROMBIN TIME: CPT | Performed by: PHYSICIAN ASSISTANT

## 2019-01-18 ASSESSMENT — PAIN SCALES - GENERAL: PAINLEVEL: NO PAIN (0)

## 2019-01-18 ASSESSMENT — MIFFLIN-ST. JEOR: SCORE: 2142.32

## 2019-01-18 NOTE — LETTER
1/18/2019      RE: Briana Linda  [address] [address]       Hepatology Follow-up Clinic note  Briana Linda   Date of Birth 1975  Date of Service 1/18/2019    Reason for follow-up: Elevated LFT's          Assessment/plan:   Briana Linda is a 43 year old female with likely fatty liver disease. She did have a positive STEPHANIA, but negative f-actin. Transaminases today have reduced in half, but are still mildly elevated. She states her diet has improved. She has not started any regular exercise routine at this time. Her weight has been relatively stable. We discussed a lifestyle management program for the weight loss but she was not interested at this time. Her liver function is otherwise normal and she has no stigmata of chronic liver disease at this time.     - Continue slow gradual weight loss  - Optimization of co-morbidities   - Start regular exercise regimen   - Follow-up in clinic in 6 months or sooner as needed    Tammy Sloan PA-C   [facility] Hepatology clinic    Total time involved with patient was 25 minutes with >50% of time involved with counseling about nutrition and exercise as noted above.     -----------------------------------------------------       HPI:   Briana Linda is a 43 year old female  presenting for follow-up.     Patient was last seen by me on 7/18/2018. She denies any recent hospitalizations or ER visits. She was diagnosed with Hashimoto's thyroiditis. She was started on levothyroxine again and she states she had reactions to medications so she switched back to [place]. She states she lost some weight and has made changes to her diet including eating healthier snacks. She has not started any regular exercise. She is looking at joining a gym this coming week. Per chart review, her weight went from 296 to 309 lbs. She has gained about 30 lbs in the last 5 years.     Patient denies jaundice, lower extremity edema, abdominal distension or confusion.  Patient  "also denies melena, hematochezia or hematemesis. Patient denies weight loss, fevers, sweats or chills. No significant alcohol use. She lives with her  and 17 and 19 year old children.     Previous work-up:   CMV IgG 1.20 by  CMV IgM greater than 240 high  CMA DNA - not detected  STEPHANIA positive, 1:160  Hepatitis A IgM nonreactive  Hep B surface antigen nonreactive   hep B core antibody nonreactive  Hep C antibody negative  Vitamin D - 11 - low   f-actin- 8   Hemoglobin A1c: 4.4  TTg - less than 1 ({normal)        Medical hx Surgical hx   Past Medical History:   Diagnosis Date     GERD (gastroesophageal reflux disease)      Hypothyroidism      Morbid obesity (H)     Past Surgical History:   Procedure Laterality Date     HYSTERECTOMY, VAGINAL  1/24/2009                 Medications:     Current Outpatient Medications   Medication     thyroid (ARMOUR THYROID) 60 MG tablet     No current facility-administered medications for this visit.             Allergies:     Allergies   Allergen Reactions     Amoxicillin      Zithromax [Azithromycin Dihydrate]             Review of Systems:   10 points ROS was obtained and highlighted in the HPI, otherwise negative.          Physical Exam:   VS:  /88   Pulse 71   Temp 98.4  F (36.9  C) (Oral)   Ht 1.778 m (5' 10\")   Wt 140.2 kg (309 lb 1.6 oz)   BMI 44.35 kg/m         Gen- well, NAD, A+Ox3, normal color  Lym- no palpable LAD  CVS- RRR  RS- CTA  Abd- central adiposity, soft, nontender. No palpable organomegaly.   Extr- hands normal, no MARIELLA  Skin- no rash or jaundice  Neuro- no asterixis  Psych- normal mood, flat affect           Data:   Reviewed in person and significant for:    Lab Results   Component Value Date     07/18/2018      Lab Results   Component Value Date    POTASSIUM 4.4 07/18/2018     Lab Results   Component Value Date    CHLORIDE 112 07/18/2018     Lab Results   Component Value Date    CO2 22 07/18/2018     Lab Results   Component Value Date    BUN " 20 07/18/2018     Lab Results   Component Value Date    CR 0.80 07/18/2018       Lab Results   Component Value Date    WBC 6.4 07/18/2018     Lab Results   Component Value Date    HGB 12.9 07/18/2018     Lab Results   Component Value Date    HCT 39.3 07/18/2018     Lab Results   Component Value Date    MCV 91 07/18/2018     Lab Results   Component Value Date     07/18/2018       Lab Results   Component Value Date    AST 86 07/18/2018     Lab Results   Component Value Date     07/18/2018     No results found for: BILICONJ   Lab Results   Component Value Date    BILITOTAL 0.4 07/18/2018       Lab Results   Component Value Date    ALBUMIN 3.8 07/18/2018     Lab Results   Component Value Date    PROTTOTAL 7.4 07/18/2018      Lab Results   Component Value Date    ALKPHOS 88 07/18/2018       Lab Results   Component Value Date    INR 1.02 07/18/2018       Tammy Sloan PA-C

## 2019-01-18 NOTE — NURSING NOTE
"/88   Pulse 71   Temp 98.4  F (36.9  C) (Oral)   Ht 1.778 m (5' 10\")   Wt 140.2 kg (309 lb 1.6 oz)   BMI 44.35 kg/m    Chief Complaint   Patient presents with     RECHECK     follow up with elevated lab results, tzimmer cma       "

## 2019-06-13 DIAGNOSIS — K76.0 FATTY LIVER DISEASE, NONALCOHOLIC: Primary | ICD-10-CM

## 2019-07-18 ENCOUNTER — PRE VISIT (OUTPATIENT)
Dept: GASTROENTEROLOGY | Facility: CLINIC | Age: 44
End: 2019-07-18

## 2019-07-18 NOTE — PROGRESS NOTES
Hepatology Follow-up Clinic note  Briana Linda   Date of Birth 1975  Date of Service 7/18/2019    Reason for follow-up: Elevated LFT's         Assessment/plan:   Briana Linda is a 44 year old female with history of elevated LFT's, likely due to fatty liver disease. She has history of a positive STEPHANIA and normal F-actin. She does have hypothyroidism. Weight loss continues to be challenging for her. She has food intolerances with vegetables that causes significant abdominal and diarrhea. It is difficult to assess if she overexerts herself with physical activity. She would like benefit from a structured and guided physical activity program to ensure gradual increase of her physical activity. Discussed referral to weight management program, but she declines due to the distance as she lives in [place]. We discussed obtaining a liver biopsy to determine etiology of liver disease and baseline scarring, she will think about it. Transaminases are trending down, still mildly elevated. Her liver function is otherwise normal. No stigmata of chronic liver disease.     - Start regular physical activity / recommend structured program   - Recommend slow gradual weight   - Follow-up in clinic in 6 months     Tammy Sloan PA-C   [facility] Hepatology clinic    -----------------------------------------------------       HPI:   Briana Linda is a 44 year old female presenting for follow-up.     Dx: Elevated LFT's  Etiology:   Bx: declined    Patient was last seen by me on 1/18/2019.  She denies any new medications, ER visits or hospitalizations.    Her weight has remained stable.  She has tried multiple different diets including intermittent fasting and keto/low-carb diet. She states she gains weight when she starts eating a lower carb diet.  She states she will do it for a few days and her body does not tolerate it. She states she experiences abdominal pain and diarrhea after consuming cruciferous foods like  cauliflower.     For physical activity, she does try going for walks and bike rides.  She states that after her to physical activity, she develops cold-like symptoms.  She states she is not able to push herself due to this recurring cycle.      Patient denies jaundice, lower extremity edema, abdominal distension or confusion.  Patient also denies melena, hematochezia or hematemesis. Patient denies weight loss, fevers, sweats or chills.    She does not drink alcohol.     Previous work-up:   CMV IgG 1.20 by  CMV IgM greater than 240 high  CMA DNA - not detected  STEPHANIA positive, 1:160  Hepatitis A IgM nonreactive  Hep B surface antigen nonreactive   hep B core antibody nonreactive  Hep C antibody negative  Vitamin D - 11 - low   f-actin- 8   Hemoglobin A1c: 4.4  TTg - less than 1 ({normal)        Medical hx Surgical hx   Past Medical History:   Diagnosis Date     GERD (gastroesophageal reflux disease)      Hypothyroidism      Morbid obesity (H)     Past Surgical History:   Procedure Laterality Date     HYSTERECTOMY, VAGINAL  1/24/2009                 Medications:     Current Outpatient Medications   Medication     thyroid (ARMANI THYROID) 60 MG tablet     No current facility-administered medications for this visit.             Allergies:     Allergies   Allergen Reactions     Amoxicillin      Zithromax [Azithromycin Dihydrate]             Review of Systems:   10 points ROS was obtained and highlighted in the HPI, otherwise negative.          Physical Exam:   VS:  /82 (BP Location: Right arm, Patient Position: Sitting, Cuff Size: Adult Large)   Pulse 54   Temp 98  F (36.7  C)   Wt 138.1 kg (304 lb 6.4 oz)   BMI 43.68 kg/m        Gen- well, NAD, A+Ox3, normal color  Lym- no palpable LAD  CVS- RRR  RS- CTA  Abd-   Extr- hands normal, no MARIELLA  Skin- no rash or jaundice  Neuro- no asterixis  Psych- normal mood           Data:   Reviewed in person and significant for:    Lab Results   Component Value Date      01/18/2019      Lab Results   Component Value Date    POTASSIUM 4.1 01/18/2019     Lab Results   Component Value Date    CHLORIDE 106 01/18/2019     Lab Results   Component Value Date    CO2 27 01/18/2019     Lab Results   Component Value Date    BUN 13 01/18/2019     Lab Results   Component Value Date    CR 0.76 01/18/2019       Lab Results   Component Value Date    WBC 5.6 01/18/2019     Lab Results   Component Value Date    HGB 13.1 01/18/2019     Lab Results   Component Value Date    HCT 42.0 01/18/2019     Lab Results   Component Value Date    MCV 91 01/18/2019     Lab Results   Component Value Date     01/18/2019       Lab Results   Component Value Date    AST 43 01/18/2019     Lab Results   Component Value Date    ALT 79 01/18/2019     No results found for: BILICONJ   Lab Results   Component Value Date    BILITOTAL 0.4 01/18/2019       Lab Results   Component Value Date    ALBUMIN 3.6 01/18/2019     Lab Results   Component Value Date    PROTTOTAL 7.5 01/18/2019      Lab Results   Component Value Date    ALKPHOS 76 01/18/2019       Lab Results   Component Value Date    INR 1.02 01/18/2019

## 2019-07-18 NOTE — PROGRESS NOTES
Was the patient contacted by phone and reminded of the upcoming visit? Yes    Was the patient instructed to bring a current list of all medications to the appointment or instructed to bring in all medication bottles? Yes, patient verbalized understanding    Was the patient instructed to arrive prior to the appointment time to have ordered labs drawn? Yes, patient verbalized understanding    Were the needed lab orders placed? Yes    Was lab appointment made 1 hour or more prior to visit? yes    Patient instructed to arrive early for check-in    Jazmin Ferraro CMA  7/18/2019 6:05 PM

## 2019-07-19 ENCOUNTER — OFFICE VISIT (OUTPATIENT)
Dept: GASTROENTEROLOGY | Facility: CLINIC | Age: 44
End: 2019-07-19
Attending: PHYSICIAN ASSISTANT
Payer: COMMERCIAL

## 2019-07-19 VITALS
TEMPERATURE: 98 F | BODY MASS INDEX: 43.68 KG/M2 | SYSTOLIC BLOOD PRESSURE: 118 MMHG | DIASTOLIC BLOOD PRESSURE: 82 MMHG | WEIGHT: 293 LBS | HEART RATE: 54 BPM

## 2019-07-19 DIAGNOSIS — K76.0 FATTY LIVER DISEASE, NONALCOHOLIC: ICD-10-CM

## 2019-07-19 DIAGNOSIS — K76.0 FATTY LIVER DISEASE, NONALCOHOLIC: Primary | ICD-10-CM

## 2019-07-19 DIAGNOSIS — R79.89 ABNORMAL LFTS (LIVER FUNCTION TESTS): ICD-10-CM

## 2019-07-19 LAB
ALBUMIN SERPL-MCNC: 3.8 G/DL (ref 3.4–5)
ALP SERPL-CCNC: 72 U/L (ref 40–150)
ALT SERPL W P-5'-P-CCNC: 65 U/L (ref 0–50)
ANION GAP SERPL CALCULATED.3IONS-SCNC: 6 MMOL/L (ref 3–14)
AST SERPL W P-5'-P-CCNC: 34 U/L (ref 0–45)
BILIRUB DIRECT SERPL-MCNC: <0.1 MG/DL (ref 0–0.2)
BILIRUB SERPL-MCNC: 0.4 MG/DL (ref 0.2–1.3)
BUN SERPL-MCNC: 17 MG/DL (ref 7–30)
CALCIUM SERPL-MCNC: 8.8 MG/DL (ref 8.5–10.1)
CHLORIDE SERPL-SCNC: 107 MMOL/L (ref 94–109)
CO2 SERPL-SCNC: 25 MMOL/L (ref 20–32)
CREAT SERPL-MCNC: 0.82 MG/DL (ref 0.52–1.04)
ERYTHROCYTE [DISTWIDTH] IN BLOOD BY AUTOMATED COUNT: 13.8 % (ref 10–15)
GFR SERPL CREATININE-BSD FRML MDRD: 88 ML/MIN/{1.73_M2}
GLUCOSE SERPL-MCNC: 87 MG/DL (ref 70–99)
HCT VFR BLD AUTO: 40.9 % (ref 35–47)
HGB BLD-MCNC: 13.2 G/DL (ref 11.7–15.7)
INR PPP: 1.06 (ref 0.86–1.14)
MCH RBC QN AUTO: 29.4 PG (ref 26.5–33)
MCHC RBC AUTO-ENTMCNC: 32.3 G/DL (ref 31.5–36.5)
MCV RBC AUTO: 91 FL (ref 78–100)
PLATELET # BLD AUTO: 212 10E9/L (ref 150–450)
POTASSIUM SERPL-SCNC: 4.2 MMOL/L (ref 3.4–5.3)
PROT SERPL-MCNC: 7.6 G/DL (ref 6.8–8.8)
RBC # BLD AUTO: 4.49 10E12/L (ref 3.8–5.2)
SODIUM SERPL-SCNC: 138 MMOL/L (ref 133–144)
WBC # BLD AUTO: 6 10E9/L (ref 4–11)

## 2019-07-19 PROCEDURE — 80048 BASIC METABOLIC PNL TOTAL CA: CPT | Performed by: PHYSICIAN ASSISTANT

## 2019-07-19 PROCEDURE — G0463 HOSPITAL OUTPT CLINIC VISIT: HCPCS | Mod: ZF

## 2019-07-19 PROCEDURE — 80076 HEPATIC FUNCTION PANEL: CPT | Performed by: PHYSICIAN ASSISTANT

## 2019-07-19 PROCEDURE — 85027 COMPLETE CBC AUTOMATED: CPT | Performed by: PHYSICIAN ASSISTANT

## 2019-07-19 PROCEDURE — 36415 COLL VENOUS BLD VENIPUNCTURE: CPT | Performed by: PHYSICIAN ASSISTANT

## 2019-07-19 PROCEDURE — 85610 PROTHROMBIN TIME: CPT | Performed by: PHYSICIAN ASSISTANT

## 2019-07-19 ASSESSMENT — PAIN SCALES - GENERAL: PAINLEVEL: NO PAIN (0)

## 2019-07-19 NOTE — LETTER
7/19/2019      RE: Briana Linda  [address] [facility] [address]       Hepatology Follow-up Clinic note  Briana Linda   Date of Birth 1975  Date of Service 7/18/2019    Reason for follow-up: Elevated LFT's         Assessment/plan:   Briana Linda is a 44 year old female with history of elevated LFT's, likely due to fatty liver disease. She has history of a positive STEPHANIA and normal F-actin. She does have hypothyroidism. Weight loss continues to be challenging for her. She has food intolerances with vegetables that causes significant abdominal and diarrhea. It is difficult to assess if she overexerts herself with physical activity. She would like benefit from a structured and guided physical activity program to ensure gradual increase of her physical activity. Discussed referral to weight management program, but she declines due to the distance as she lives in [place]. We discussed obtaining a liver biopsy to determine etiology of liver disease and baseline scarring, she will think about it. Transaminases are trending down, still mildly elevated. Her liver function is otherwise normal. No stigmata of chronic liver disease.     - Start regular physical activity / recommend structured program   - Recommend slow gradual weight   - Follow-up in clinic in 6 months     Tammy Sloan PA-C   [facility] Hepatology clinic    -----------------------------------------------------       HPI:   Briana Linda is a 44 year old female presenting for follow-up.     Dx: Elevated LFT's  Etiology:   Bx: declined    Patient was last seen by me on 1/18/2019.  She denies any new medications, ER visits or hospitalizations.    Her weight has remained stable.  She has tried multiple different diets including intermittent fasting and keto/low-carb diet. She states she gains weight when she starts eating a lower carb diet.  She states she will do it for a few days and her body does not tolerate it. She states she  experiences abdominal pain and diarrhea after consuming cruciferous foods like cauliflower.     For physical activity, she does try going for walks and bike rides.  She states that after her to physical activity, she develops cold-like symptoms.  She states she is not able to push herself due to this recurring cycle.      Patient denies jaundice, lower extremity edema, abdominal distension or confusion.  Patient also denies melena, hematochezia or hematemesis. Patient denies weight loss, fevers, sweats or chills.    She does not drink alcohol.     Previous work-up:   CMV IgG 1.20 by  CMV IgM greater than 240 high  CMA DNA - not detected  STEPHANIA positive, 1:160  Hepatitis A IgM nonreactive  Hep B surface antigen nonreactive   hep B core antibody nonreactive  Hep C antibody negative  Vitamin D - 11 - low   f-actin- 8   Hemoglobin A1c: 4.4  TTg - less than 1 ({normal)        Medical hx Surgical hx   Past Medical History:   Diagnosis Date     GERD (gastroesophageal reflux disease)      Hypothyroidism      Morbid obesity (H)     Past Surgical History:   Procedure Laterality Date     HYSTERECTOMY, VAGINAL  1/24/2009                 Medications:     Current Outpatient Medications   Medication     thyroid (ARMANI THYROID) 60 MG tablet     No current facility-administered medications for this visit.             Allergies:     Allergies   Allergen Reactions     Amoxicillin      Zithromax [Azithromycin Dihydrate]             Review of Systems:   10 points ROS was obtained and highlighted in the HPI, otherwise negative.          Physical Exam:   VS:  /82 (BP Location: Right arm, Patient Position: Sitting, Cuff Size: Adult Large)   Pulse 54   Temp 98  F (36.7  C)   Wt 138.1 kg (304 lb 6.4 oz)   BMI 43.68 kg/m         Gen- well, NAD, A+Ox3, normal color  Lym- no palpable LAD  CVS- RRR  RS- CTA  Abd-   Extr- hands normal, no MARIELLA  Skin- no rash or jaundice  Neuro- no asterixis  Psych- normal mood           Data:   Reviewed  in person and significant for:    Lab Results   Component Value Date     01/18/2019      Lab Results   Component Value Date    POTASSIUM 4.1 01/18/2019     Lab Results   Component Value Date    CHLORIDE 106 01/18/2019     Lab Results   Component Value Date    CO2 27 01/18/2019     Lab Results   Component Value Date    BUN 13 01/18/2019     Lab Results   Component Value Date    CR 0.76 01/18/2019       Lab Results   Component Value Date    WBC 5.6 01/18/2019     Lab Results   Component Value Date    HGB 13.1 01/18/2019     Lab Results   Component Value Date    HCT 42.0 01/18/2019     Lab Results   Component Value Date    MCV 91 01/18/2019     Lab Results   Component Value Date     01/18/2019       Lab Results   Component Value Date    AST 43 01/18/2019     Lab Results   Component Value Date    ALT 79 01/18/2019     No results found for: BILICONJ   Lab Results   Component Value Date    BILITOTAL 0.4 01/18/2019       Lab Results   Component Value Date    ALBUMIN 3.6 01/18/2019     Lab Results   Component Value Date    PROTTOTAL 7.5 01/18/2019      Lab Results   Component Value Date    ALKPHOS 76 01/18/2019       Lab Results   Component Value Date    INR 1.02 01/18/2019       Tammy Sloan PA-C

## 2019-07-19 NOTE — LETTER
7/19/2019      RE: Briana Linda  [address] [facility] [address]       Hepatology Follow-up Clinic note  Briana Linda   Date of Birth 1975  Date of Service 7/18/2019    Reason for follow-up: Elevated LFT's         Assessment/plan:   Briana Linda is a 44 year old female with history of elevated LFT's, likely due to fatty liver disease. She has history of a positive STEPHANIA and normal F-actin. She does have hypothyroidism. Weight loss continues to be challenging for her. She has food intolerances with vegetables that causes significant abdominal and diarrhea. It is difficult to assess if she overexerts herself with physical activity. She would like benefit from a structured and guided physical activity program to ensure gradual increase of her physical activity. Discussed referral to weight management program, but she declines due to the distance as she lives in [place]. We discussed obtaining a liver biopsy to determine etiology of liver disease and baseline scarring, she will think about it. Transaminases are trending down, still mildly elevated. Her liver function is otherwise normal. No stigmata of chronic liver disease.     - Start regular physical activity / recommend structured program   - Recommend slow gradual weight   - Follow-up in clinic in 6 months     Tammy Sloan PA-C   [facility] Hepatology clinic    -----------------------------------------------------       HPI:   Briana Linda is a 44 year old female presenting for follow-up.     Dx: Elevated LFT's  Etiology:   Bx: declined    Patient was last seen by me on 1/18/2019.  She denies any new medications, ER visits or hospitalizations.    Her weight has remained stable.  She has tried multiple different diets including intermittent fasting and keto/low-carb diet. She states she gains weight when she starts eating a lower carb diet.  She states she will do it for a few days and her body does not tolerate it. She states she  experiences abdominal pain and diarrhea after consuming cruciferous foods like cauliflower.     For physical activity, she does try going for walks and bike rides.  She states that after her to physical activity, she develops cold-like symptoms.  She states she is not able to push herself due to this recurring cycle.      Patient denies jaundice, lower extremity edema, abdominal distension or confusion.  Patient also denies melena, hematochezia or hematemesis. Patient denies weight loss, fevers, sweats or chills.    She does not drink alcohol.     Previous work-up:   CMV IgG 1.20 by  CMV IgM greater than 240 high  CMA DNA - not detected  STEPHANIA positive, 1:160  Hepatitis A IgM nonreactive  Hep B surface antigen nonreactive   hep B core antibody nonreactive  Hep C antibody negative  Vitamin D - 11 - low   f-actin- 8   Hemoglobin A1c: 4.4  TTg - less than 1 ({normal)        Medical hx Surgical hx   Past Medical History:   Diagnosis Date     GERD (gastroesophageal reflux disease)      Hypothyroidism      Morbid obesity (H)     Past Surgical History:   Procedure Laterality Date     HYSTERECTOMY, VAGINAL  1/24/2009                 Medications:     Current Outpatient Medications   Medication     thyroid (ARMANI THYROID) 60 MG tablet     No current facility-administered medications for this visit.             Allergies:     Allergies   Allergen Reactions     Amoxicillin      Zithromax [Azithromycin Dihydrate]             Review of Systems:   10 points ROS was obtained and highlighted in the HPI, otherwise negative.          Physical Exam:   VS:  /82 (BP Location: Right arm, Patient Position: Sitting, Cuff Size: Adult Large)   Pulse 54   Temp 98  F (36.7  C)   Wt 138.1 kg (304 lb 6.4 oz)   BMI 43.68 kg/m         Gen- well, NAD, A+Ox3, normal color  Lym- no palpable LAD  CVS- RRR  RS- CTA  Abd-   Extr- hands normal, no MARIELLA  Skin- no rash or jaundice  Neuro- no asterixis  Psych- normal mood           Data:   Reviewed  in person and significant for:    Lab Results   Component Value Date     01/18/2019      Lab Results   Component Value Date    POTASSIUM 4.1 01/18/2019     Lab Results   Component Value Date    CHLORIDE 106 01/18/2019     Lab Results   Component Value Date    CO2 27 01/18/2019     Lab Results   Component Value Date    BUN 13 01/18/2019     Lab Results   Component Value Date    CR 0.76 01/18/2019       Lab Results   Component Value Date    WBC 5.6 01/18/2019     Lab Results   Component Value Date    HGB 13.1 01/18/2019     Lab Results   Component Value Date    HCT 42.0 01/18/2019     Lab Results   Component Value Date    MCV 91 01/18/2019     Lab Results   Component Value Date     01/18/2019       Lab Results   Component Value Date    AST 43 01/18/2019     Lab Results   Component Value Date    ALT 79 01/18/2019     No results found for: BILICONJ   Lab Results   Component Value Date    BILITOTAL 0.4 01/18/2019       Lab Results   Component Value Date    ALBUMIN 3.6 01/18/2019     Lab Results   Component Value Date    PROTTOTAL 7.5 01/18/2019      Lab Results   Component Value Date    ALKPHOS 76 01/18/2019       Lab Results   Component Value Date    INR 1.02 01/18/2019       Tammy Sloan PA-C

## 2019-07-19 NOTE — LETTER
7/19/2019       RE: Briana Linda  [address] [place] [id number]     Dear Colleague,    Thank you for referring your patient, Briana Linda, to the [facility] HEPATOLOGY at [facility]. Please see a copy of my visit note below.    Hepatology Follow-up Clinic note  Briana Linda   Date of Birth 1975  Date of Service 7/18/2019    Reason for follow-up: Elevated LFT's         Assessment/plan:   Briana Linda is a 44 year old female with history of elevated LFT's, likely due to fatty liver disease. She has history of a positive STEPHANIA and normal F-actin. She does have hypothyroidism. Weight loss continues to be challenging for her. She has food intolerances with vegetables that causes significant abdominal and diarrhea. It is difficult to assess if she overexerts herself with physical activity. She would like benefit from a structured and guided physical activity program to ensure gradual increase of her physical activity. Discussed referral to weight management program, but she declines due to the distance as she lives in [place]. We discussed obtaining a liver biopsy to determine etiology of liver disease and baseline scarring, she will think about it. Transaminases are trending down, still mildly elevated. Her liver function is otherwise normal. No stigmata of chronic liver disease.     - Start regular physical activity / recommend structured program   - Recommend slow gradual weight   - Follow-up in clinic in 6 months     Tammy Sloan PA-C   [facility] Hepatology clinic    -----------------------------------------------------       HPI:   Briana Linda is a 44 year old female presenting for follow-up.     Dx: Elevated LFT's  Etiology:   Bx: declined    Patient was last seen by me on 1/18/2019.  She denies any new medications, ER visits or hospitalizations.    Her weight has remained stable.  She has tried multiple different diets including intermittent  fasting and keto/low-carb diet. She states she gains weight when she starts eating a lower carb diet.  She states she will do it for a few days and her body does not tolerate it. She states she experiences abdominal pain and diarrhea after consuming cruciferous foods like cauliflower.     For physical activity, she does try going for walks and bike rides.  She states that after her to physical activity, she develops cold-like symptoms.  She states she is not able to push herself due to this recurring cycle.      Patient denies jaundice, lower extremity edema, abdominal distension or confusion.  Patient also denies melena, hematochezia or hematemesis. Patient denies weight loss, fevers, sweats or chills.    She does not drink alcohol.     Previous work-up:   CMV IgG 1.20 by  CMV IgM greater than 240 high  CMA DNA - not detected  STEPHANIA positive, 1:160  Hepatitis A IgM nonreactive  Hep B surface antigen nonreactive   hep B core antibody nonreactive  Hep C antibody negative  Vitamin D - 11 - low   f-actin- 8   Hemoglobin A1c: 4.4  TTg - less than 1 ({normal)        Medical hx Surgical hx   Past Medical History:   Diagnosis Date     GERD (gastroesophageal reflux disease)      Hypothyroidism      Morbid obesity (H)     Past Surgical History:   Procedure Laterality Date     HYSTERECTOMY, VAGINAL  1/24/2009                 Medications:     Current Outpatient Medications   Medication     thyroid (ARMOUR THYROID) 60 MG tablet     No current facility-administered medications for this visit.             Allergies:     Allergies   Allergen Reactions     Amoxicillin      Zithromax [Azithromycin Dihydrate]             Review of Systems:   10 points ROS was obtained and highlighted in the HPI, otherwise negative.          Physical Exam:   VS:  /82 (BP Location: Right arm, Patient Position: Sitting, Cuff Size: Adult Large)   Pulse 54   Temp 98  F (36.7  C)   Wt 138.1 kg (304 lb 6.4 oz)   BMI 43.68 kg/m         Gen- well,  NAD, A+Ox3, normal color  Lym- no palpable LAD  CVS- RRR  RS- CTA  Abd-   Extr- hands normal, no MARIELLA  Skin- no rash or jaundice  Neuro- no asterixis  Psych- normal mood           Data:   Reviewed in person and significant for:    Lab Results   Component Value Date     01/18/2019      Lab Results   Component Value Date    POTASSIUM 4.1 01/18/2019     Lab Results   Component Value Date    CHLORIDE 106 01/18/2019     Lab Results   Component Value Date    CO2 27 01/18/2019     Lab Results   Component Value Date    BUN 13 01/18/2019     Lab Results   Component Value Date    CR 0.76 01/18/2019       Lab Results   Component Value Date    WBC 5.6 01/18/2019     Lab Results   Component Value Date    HGB 13.1 01/18/2019     Lab Results   Component Value Date    HCT 42.0 01/18/2019     Lab Results   Component Value Date    MCV 91 01/18/2019     Lab Results   Component Value Date     01/18/2019       Lab Results   Component Value Date    AST 43 01/18/2019     Lab Results   Component Value Date    ALT 79 01/18/2019     No results found for: BILICONJ   Lab Results   Component Value Date    BILITOTAL 0.4 01/18/2019       Lab Results   Component Value Date    ALBUMIN 3.6 01/18/2019     Lab Results   Component Value Date    PROTTOTAL 7.5 01/18/2019      Lab Results   Component Value Date    ALKPHOS 76 01/18/2019       Lab Results   Component Value Date    INR 1.02 01/18/2019       Again, thank you for allowing me to participate in the care of your patient.      Sincerely,    Tammy Sloan PA-C

## 2019-07-19 NOTE — LETTER
7/19/2019       RE: Briana Linda  [address] [place] [id number]     Dear Colleague,    Thank you for referring your patient, Briana Linda, to the [facility] HEPATOLOGY at [facility]. Please see a copy of my visit note below.    Hepatology Follow-up Clinic note  Briana Linda   Date of Birth 1975  Date of Service 7/18/2019    Reason for follow-up: Elevated LFT's         Assessment/plan:   Briana Linda is a 44 year old female with history of elevated LFT's, likely due to fatty liver disease. She has history of a positive STEPHANIA and normal F-actin. She does have hypothyroidism. Weight loss continues to be challenging for her. She has food intolerances with vegetables that causes significant abdominal and diarrhea. It is difficult to assess if she overexerts herself with physical activity. She would like benefit from a structured and guided physical activity program to ensure gradual increase of her physical activity. Discussed referral to weight management program, but she declines due to the distance as she lives in [place]. We discussed obtaining a liver biopsy to determine etiology of liver disease and baseline scarring, she will think about it. Transaminases are trending down, still mildly elevated. Her liver function is otherwise normal. No stigmata of chronic liver disease.     - Start regular physical activity / recommend structured program   - Recommend slow gradual weight   - Follow-up in clinic in 6 months     Tammy Sloan PA-C   [facility] Hepatology clinic    -----------------------------------------------------       HPI:   Briana Linda is a 44 year old female presenting for follow-up.     Dx: Elevated LFT's  Etiology:   Bx: declined    Patient was last seen by me on 1/18/2019.  She denies any new medications, ER visits or hospitalizations.    Her weight has remained stable.  She has tried multiple different diets including intermittent  fasting and keto/low-carb diet. She states she gains weight when she starts eating a lower carb diet.  She states she will do it for a few days and her body does not tolerate it. She states she experiences abdominal pain and diarrhea after consuming cruciferous foods like cauliflower.     For physical activity, she does try going for walks and bike rides.  She states that after her to physical activity, she develops cold-like symptoms.  She states she is not able to push herself due to this recurring cycle.      Patient denies jaundice, lower extremity edema, abdominal distension or confusion.  Patient also denies melena, hematochezia or hematemesis. Patient denies weight loss, fevers, sweats or chills.    She does not drink alcohol.     Previous work-up:   CMV IgG 1.20 by  CMV IgM greater than 240 high  CMA DNA - not detected  STEPHANIA positive, 1:160  Hepatitis A IgM nonreactive  Hep B surface antigen nonreactive   hep B core antibody nonreactive  Hep C antibody negative  Vitamin D - 11 - low   f-actin- 8   Hemoglobin A1c: 4.4  TTg - less than 1 ({normal)        Medical hx Surgical hx   Past Medical History:   Diagnosis Date     GERD (gastroesophageal reflux disease)      Hypothyroidism      Morbid obesity (H)     Past Surgical History:   Procedure Laterality Date     HYSTERECTOMY, VAGINAL  1/24/2009                 Medications:     Current Outpatient Medications   Medication     thyroid (ARMOUR THYROID) 60 MG tablet     No current facility-administered medications for this visit.             Allergies:     Allergies   Allergen Reactions     Amoxicillin      Zithromax [Azithromycin Dihydrate]             Review of Systems:   10 points ROS was obtained and highlighted in the HPI, otherwise negative.          Physical Exam:   VS:  /82 (BP Location: Right arm, Patient Position: Sitting, Cuff Size: Adult Large)   Pulse 54   Temp 98  F (36.7  C)   Wt 138.1 kg (304 lb 6.4 oz)   BMI 43.68 kg/m         Gen- well,  NAD, A+Ox3, normal color  Lym- no palpable LAD  CVS- RRR  RS- CTA  Abd-   Extr- hands normal, no MARIELLA  Skin- no rash or jaundice  Neuro- no asterixis  Psych- normal mood           Data:   Reviewed in person and significant for:    Lab Results   Component Value Date     01/18/2019      Lab Results   Component Value Date    POTASSIUM 4.1 01/18/2019     Lab Results   Component Value Date    CHLORIDE 106 01/18/2019     Lab Results   Component Value Date    CO2 27 01/18/2019     Lab Results   Component Value Date    BUN 13 01/18/2019     Lab Results   Component Value Date    CR 0.76 01/18/2019       Lab Results   Component Value Date    WBC 5.6 01/18/2019     Lab Results   Component Value Date    HGB 13.1 01/18/2019     Lab Results   Component Value Date    HCT 42.0 01/18/2019     Lab Results   Component Value Date    MCV 91 01/18/2019     Lab Results   Component Value Date     01/18/2019       Lab Results   Component Value Date    AST 43 01/18/2019     Lab Results   Component Value Date    ALT 79 01/18/2019     No results found for: BILICONJ   Lab Results   Component Value Date    BILITOTAL 0.4 01/18/2019       Lab Results   Component Value Date    ALBUMIN 3.6 01/18/2019     Lab Results   Component Value Date    PROTTOTAL 7.5 01/18/2019      Lab Results   Component Value Date    ALKPHOS 76 01/18/2019       Lab Results   Component Value Date    INR 1.02 01/18/2019       Again, thank you for allowing me to participate in the care of your patient.      Sincerely,    Tammy Sloan PA-C

## 2019-07-19 NOTE — NURSING NOTE
"Chief Complaint   Patient presents with     RECHECK     6 mos recheck     Vital signs:  Temp: 98  F (36.7  C)   BP: 118/82 Pulse: 54             Weight: 138.1 kg (304 lb 6.4 oz)  Estimated body mass index is 43.68 kg/m  as calculated from the following:    Height as of 1/18/19: 1.778 m (5' 10\").    Weight as of this encounter: 138.1 kg (304 lb 6.4 oz).          Surya Williamson  EMT  "

## 2020-01-09 DIAGNOSIS — K76.0 FATTY LIVER DISEASE, NONALCOHOLIC: Primary | ICD-10-CM

## 2020-03-02 ENCOUNTER — HEALTH MAINTENANCE LETTER (OUTPATIENT)
Age: 45
End: 2020-03-02

## 2020-12-20 ENCOUNTER — HEALTH MAINTENANCE LETTER (OUTPATIENT)
Age: 45
End: 2020-12-20

## 2021-02-28 ENCOUNTER — HEALTH MAINTENANCE LETTER (OUTPATIENT)
Age: 46
End: 2021-02-28

## 2021-04-18 ENCOUNTER — HEALTH MAINTENANCE LETTER (OUTPATIENT)
Age: 46
End: 2021-04-18

## 2021-10-03 ENCOUNTER — HEALTH MAINTENANCE LETTER (OUTPATIENT)
Age: 46
End: 2021-10-03

## 2022-03-20 ENCOUNTER — HEALTH MAINTENANCE LETTER (OUTPATIENT)
Age: 47
End: 2022-03-20

## 2022-05-14 ENCOUNTER — HEALTH MAINTENANCE LETTER (OUTPATIENT)
Age: 47
End: 2022-05-14

## 2022-09-04 ENCOUNTER — HEALTH MAINTENANCE LETTER (OUTPATIENT)
Age: 47
End: 2022-09-04

## 2023-04-30 ENCOUNTER — HEALTH MAINTENANCE LETTER (OUTPATIENT)
Age: 48
End: 2023-04-30

## 2023-06-03 ENCOUNTER — HEALTH MAINTENANCE LETTER (OUTPATIENT)
Age: 48
End: 2023-06-03